# Patient Record
Sex: MALE | Race: WHITE | URBAN - METROPOLITAN AREA
[De-identification: names, ages, dates, MRNs, and addresses within clinical notes are randomized per-mention and may not be internally consistent; named-entity substitution may affect disease eponyms.]

---

## 2017-02-14 ENCOUNTER — IMPORTED ENCOUNTER (OUTPATIENT)
Dept: URBAN - METROPOLITAN AREA CLINIC 50 | Facility: CLINIC | Age: 77
End: 2017-02-14

## 2017-05-10 ENCOUNTER — IMPORTED ENCOUNTER (OUTPATIENT)
Dept: URBAN - METROPOLITAN AREA CLINIC 50 | Facility: CLINIC | Age: 77
End: 2017-05-10

## 2017-05-17 ENCOUNTER — IMPORTED ENCOUNTER (OUTPATIENT)
Dept: URBAN - METROPOLITAN AREA CLINIC 50 | Facility: CLINIC | Age: 77
End: 2017-05-17

## 2017-05-18 ENCOUNTER — IMPORTED ENCOUNTER (OUTPATIENT)
Dept: URBAN - METROPOLITAN AREA CLINIC 50 | Facility: CLINIC | Age: 77
End: 2017-05-18

## 2021-04-17 ASSESSMENT — TONOMETRY
OS_IOP_MMHG: 15
OD_IOP_MMHG: 15

## 2021-04-17 ASSESSMENT — VISUAL ACUITY
OD_CC: J1+@ 16 IN
OS_BAT: 20/25
OS_OTHER: 20/25. 20/30.
OD_OTHER: 20/25. 20/30.
OS_CC: J1+@ 16 IN
OS_CC: 20/25
OD_PH: 20/25
OD_BAT: 20/25
OD_CC: 20/40

## 2021-12-17 NOTE — PATIENT DISCUSSION
1.  Dry Eyes OU:  Start PF tears qid and gels or stacia qhs. Encouraged regular use. Pt getting alot of tearing. Brenden 8/9.2. 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 Was told he had rosacea on cheeks. 3.  Pseudophakia OU - IOLs stable. Monitor for changes in vision. 4.  ALlergies/ Asthma--Pt uses Flonase qam and Singulair every day. May switch to Zyrtec for while5. Headache--possibly from sinus or allrgies-- will try Benadryl bedtime per wife6.   RTN 1 mth OC/Brenden

## 2022-01-17 NOTE — PATIENT DISCUSSION
1.  Dry Eyes OU:  Better-- cont PF tears qid and gels or stacia qhs. Encouraged regular use. Pt getting alot of tearing. Brenden 8/9.2. Ocular Rosacea--  Can go back on Doxy 50 mg qam till gone--if pt felt it helps then renew rx.   told he had rosacea on cheeks. 3.  Pseudophakia OU - IOLs stable. Monitor for changes in vision. 4.  ALlergies/ Asthma--Pt uses Flonase qam and Singulair every day. May switch to Zyrtec for while5. Headache--better with use of 200mg Doxy from PCP for 10 days. possibly from sinus or allrgies-- Did not try Benadryl bedtime per wife6.   RTN 1 mth OC/Brenden--or pt can call with update

## 2022-02-14 NOTE — PATIENT DISCUSSION
1.  Dry Eyes OU:  Better-- cont PF tears qid and gels or stacia qhs. Encouraged regular use. Pt getting alot of tearing. Brenden 8/9.2. Ocular Rosacea--  Dc Doxy--   told he had rosacea on cheeks. 3.  Pseudophakia OU - IOLs stable. Monitor for changes in vision. 4.  ALlergies/ Asthma--Pt uses Flonase qam and Singulair every day. May switch to Zyrtec for while5. Headache--better with use of 200mg Doxy from PCP for 10 days. possibly from sinus or allrgies-- Did not try Benadryl bedtime per wife6. 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   RTN  PRN

## 2023-02-14 NOTE — PATIENT DISCUSSION
----- Message from RAMSES Dowling sent at 2/14/2023  8:05 AM CST -----  Normal sodium and potassium. Normal blood sugar. Kidney function is mildly decreased. There is some protein in the urine that we will continue to monitor. Normal blood count, no anemia.    Allergic Conjunctivitis OU -- The condition was  discussed with the patient. Avoidance of allergens and cool compresses were recommended.